# Patient Record
Sex: FEMALE | Race: WHITE | Employment: STUDENT | URBAN - METROPOLITAN AREA
[De-identification: names, ages, dates, MRNs, and addresses within clinical notes are randomized per-mention and may not be internally consistent; named-entity substitution may affect disease eponyms.]

---
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
## 2022-02-18 PROBLEM — G89.29 CHRONIC NECK PAIN: Status: ACTIVE | Noted: 2022-02-18

## 2022-02-18 PROBLEM — M54.2 CHRONIC NECK PAIN: Status: ACTIVE | Noted: 2022-02-18

## 2022-02-18 PROBLEM — F41.1 GAD (GENERALIZED ANXIETY DISORDER): Status: ACTIVE | Noted: 2022-02-18

## 2023-01-01 ENCOUNTER — HOSPITAL ENCOUNTER (EMERGENCY)
Age: 23
Discharge: HOME | End: 2023-01-01
Payer: COMMERCIAL

## 2023-01-01 VITALS
DIASTOLIC BLOOD PRESSURE: 76 MMHG | BODY MASS INDEX: 22.73 KG/M2 | HEIGHT: 68 IN | WEIGHT: 150 LBS | TEMPERATURE: 98.2 F | OXYGEN SATURATION: 99 % | RESPIRATION RATE: 18 BRPM | SYSTOLIC BLOOD PRESSURE: 107 MMHG | HEART RATE: 96 BPM

## 2023-01-01 DIAGNOSIS — H10.9 CONJUNCTIVITIS OF BOTH EYES, UNSPECIFIED CONJUNCTIVITIS TYPE: ICD-10-CM

## 2023-01-01 DIAGNOSIS — J01.90 ACUTE SINUSITIS, RECURRENCE NOT SPECIFIED, UNSPECIFIED LOCATION: ICD-10-CM

## 2023-01-01 DIAGNOSIS — J04.0 LARYNGITIS: Primary | ICD-10-CM

## 2023-01-01 PROCEDURE — 99211 OFF/OP EST MAY X REQ PHY/QHP: CPT

## 2023-01-01 RX ORDER — ACETAMINOPHEN 500 MG
500 TABLET ORAL EVERY 6 HOURS PRN
COMMUNITY

## 2023-01-01 RX ORDER — CIPROFLOXACIN HYDROCHLORIDE 3.5 MG/ML
1 SOLUTION/ DROPS TOPICAL EVERY 4 HOURS
Qty: 10 ML | Refills: 0 | Status: SHIPPED | OUTPATIENT
Start: 2023-01-01 | End: 2023-01-08

## 2023-01-01 RX ORDER — CEFDINIR 250 MG/5ML
300 POWDER, FOR SUSPENSION ORAL 2 TIMES DAILY
Qty: 120 ML | Refills: 0 | Status: SHIPPED | OUTPATIENT
Start: 2023-01-01 | End: 2023-01-11

## 2023-01-01 ASSESSMENT — PAIN - FUNCTIONAL ASSESSMENT: PAIN_FUNCTIONAL_ASSESSMENT: NONE - DENIES PAIN

## 2023-01-01 ASSESSMENT — PAIN SCALES - GENERAL: PAINLEVEL_OUTOF10: 0

## 2023-01-01 NOTE — ED PROVIDER NOTES
Department of Emergency [address] [facility]  Provider Note  Admit Date/Time: 2023 11:40 AM  Room:   NAME: Rashad Fox  : 2000  MRN: [id number]     Chief Complaint:  Laryngitis (Was exposed to Covid and the Flu. Has tested Neg. for both several times.) and Eye Problem (Both eyes red with drainage. Lt. > Rt.)    History of Present Illness        Rashad Fox is a 25 y.o. female who has a past medical history of:   Past Medical History:   Diagnosis Date    Asthma     Chronic neck pain     OLAYINKA (generalized anxiety disorder)     Vitamin D insufficiency     presents to the urgent care center by private car for ration. She has been sick for over a week she has been to her doctor she has been tested for COVID flu and strep and it was all negative. Now she started to develop thick green drainage out of both eyes and  both eyes are pink in color. She said that she is having the same green drainage out her sinuses. She does not really have pain over her sinuses but she does have the congestion and drainage    ROS    Pertinent positives and negatives are stated within HPI, all other systems reviewed and are negative. History reviewed. No pertinent surgical history. Social History:  reports that she has never smoked. She has never used smokeless tobacco. She reports that she does not drink alcohol and does not use drugs. Family History: family history includes Breast Cancer in her maternal aunt; High Blood Pressure in her father; Migraines in her mother; Stroke in her maternal aunt and maternal grandmother; Ulcerative Colitis in her father.   Allergies: Dulera [mometasone furo-formoterol fum], Bactrim [sulfamethoxazole-trimethoprim], Petrolatum, Sunscreens, and Zithromax [azithromycin]    Physical Exam   Oxygen Saturation Interpretation: Normal.   ED Triage Vitals [23 1144]   BP Temp Temp Source Heart Rate Resp SpO2 Height Weight   107/76 98.2 °F (36.8 °C) Infrared 96 18 99 % 5' 8\" (1.727 m) 150 lb (68 kg)       Physical Exam  Constitutional/General: Alert and oriented x3, appears well, non toxic, hoarse voice  HEENT:  NC/NT. Bilateral conjunctiva are pink, some dried drainage noted around the eyes. There is no periorbital erythema or tenderness,there is no pain with extraocular movements, pupils equal round and reactive to light and accommodation. No hyphema,  Airway patent. Mild pharyngeal erythema bilateral TMs are normal, no tenderness over the sinuses  Neck: Supple, full ROM,   Respiratory: Lungs clear to auscultation bilaterally, no wheezes, rales, or rhonchi. Not in respiratory distress  CV:  Regular rate. Regular rhythm. Musculoskeletal: Moves all extremities x 4. Integument: skin warm and dry. No rashes. Lymphatic: no lymphadenopathy noted  Neurologic: GCS 15, no focal deficits,   Psychiatric: Normal Affect    Lab / Imaging Results   (All laboratory and radiology results have been personally reviewed by myself)  Labs:  No results found for this visit on 01/01/23. Imaging: All Radiology results interpreted by Radiologist unless otherwise noted. No orders to display       ED Course / Medical Decision Making   Medications - No data to display       Consult(s):   None    Procedure(s):   None    MDM:   Has been sick for over a week she has been tested for COVID and flu and strep and everything has been negative. Carolyn Vazquez has a hoarse voice she said her throat is not really sore but it is hoarse, and this morning her eyes started draining green drainage is same as what she is getting out of her sinuses. Most likely the this is coming from the tear ducts and is because of her sinuses being so congested. I did treat her for sinusitis with amoxicillin and also did put her on eyedrops for the conjunctivitis. Advised her to follow-up with her doctor if no improvement or worsening she should get reevaluated.     Plan of Care/Counseling:  I reviewed today's visit with the patient in addition to providing specific details for the plan of care and counseling regarding the diagnosis and prognosis. Questions are answered at this time and are agreeable with the plan. Assessment      1. Laryngitis    2. Acute sinusitis, recurrence not specified, unspecified location    3. Conjunctivitis of both eyes, unspecified conjunctivitis type      Plan   Discharge to home and advised to contact Kathy Dobbs MD  [address] [place] [address]  [phone]         Patient condition is good    New Medications     New Prescriptions    CEFDINIR (OMNICEF) 250 MG/5ML SUSPENSION    Take 6 mLs by mouth 2 times daily for 10 days    CIPROFLOXACIN (CILOXAN) 0.3 % OPHTHALMIC SOLUTION    Place 1 drop into both eyes every 4 hours for 7 days     Electronically signed by CLARISSA Monge CNP   DD: 1/1/23  **This report was transcribed using voice recognition software. Every effort was made to ensure accuracy; however, inadvertent computerized transcription errors may be present.   END OF ED PROVIDER NOTE      CLARISSA Monge CNP  01/01/23 [id number]